# Patient Record
Sex: MALE | Race: WHITE | Employment: FULL TIME | URBAN - METROPOLITAN AREA
[De-identification: names, ages, dates, MRNs, and addresses within clinical notes are randomized per-mention and may not be internally consistent; named-entity substitution may affect disease eponyms.]

---

## 2017-01-02 DIAGNOSIS — F41.0 ANXIETY ATTACK: Primary | ICD-10-CM

## 2017-01-02 RX ORDER — ESCITALOPRAM OXALATE 20 MG/1
TABLET ORAL
Qty: 30 TABLET | Refills: 0 | Status: SHIPPED | OUTPATIENT
Start: 2017-01-02 | End: 2017-02-13

## 2017-01-02 NOTE — TELEPHONE ENCOUNTER
Medication is being filled for 1 time refill only due to:  Patient needs to be seen because needs new med follow up appt.. Je Lagunas RN

## 2017-01-02 NOTE — TELEPHONE ENCOUNTER
escitalopram (LEXAPRO) 20 MG tablet     Last Written Prescription Date: 8/23/16  Last Fill Quantity: 30, # refills: 3  Last Office Visit with [facility] primary care provider:  8/23/16        Last PHQ-9 score on record=   PHQ-9 SCORE 4/28/2014   Total Score 3

## 2017-02-13 DIAGNOSIS — F41.0 ANXIETY ATTACK: ICD-10-CM

## 2017-02-13 NOTE — TELEPHONE ENCOUNTER
escitalopram (LEXAPRO) 20 MG     Last Written Prescription Date: 1/2/17  Last Fill Quantity: 30, # refills: 0  [place] [facility] [place]  8/23/16        Last PHQ-9 score on record=   PHQ-9 SCORE 4/28/2014   Total Score 3

## 2017-02-14 RX ORDER — ESCITALOPRAM OXALATE 20 MG/1
TABLET ORAL
Qty: 30 TABLET | Refills: 0 | Status: SHIPPED | OUTPATIENT
Start: 2017-02-14 | End: 2017-03-20

## 2017-02-14 NOTE — TELEPHONE ENCOUNTER
Medication is being filled for 1 time refill only due to:  Patient needs to be seen because overdue for medication check and phq-9.   Je Lagunas RN

## 2017-03-20 DIAGNOSIS — F41.0 ANXIETY ATTACK: ICD-10-CM

## 2017-03-20 NOTE — TELEPHONE ENCOUNTER
escitalopram (LEXAPRO) 20 MG tablet     Last Written Prescription Date: 2/14/17  Last Fill Quantity: 30, # refills: 0  Last Office Visit with [facility] primary care provider:  8/23/16        Last PHQ-9 score on record=   PHQ-9 SCORE 4/28/2014   Total Score 3

## 2017-03-22 RX ORDER — ESCITALOPRAM OXALATE 20 MG/1
TABLET ORAL
Qty: 30 TABLET | Refills: 0 | Status: SHIPPED | OUTPATIENT
Start: 2017-03-22 | End: 2017-04-26

## 2017-03-22 NOTE — TELEPHONE ENCOUNTER
Routing refill request to provider for review/approval because:  Jessica given x2 and patient did not follow up, please advise  Je Lagunas RN

## 2017-03-28 ENCOUNTER — TELEPHONE (OUTPATIENT)
Dept: FAMILY MEDICINE | Facility: CLINIC | Age: 57
End: 2017-03-28

## 2017-03-28 DIAGNOSIS — E05.90 HYPERTHYROIDISM: Primary | ICD-10-CM

## 2017-03-28 NOTE — TELEPHONE ENCOUNTER
Patient requesting testosterone testing. Has a lab appt on 4/11 and would like to come in at the same time for this as well. Would like to have this added or if he needs to come into clinic to be seen first. Please call patient at [phone].    Central Scheduling  Margo ELIAS

## 2017-04-11 DIAGNOSIS — Z11.59 NEED FOR HEPATITIS C SCREENING TEST: ICD-10-CM

## 2017-04-11 DIAGNOSIS — Z00.00 ROUTINE GENERAL MEDICAL EXAMINATION AT A HEALTH CARE FACILITY: ICD-10-CM

## 2017-04-11 DIAGNOSIS — E05.90 HYPERTHYROIDISM: ICD-10-CM

## 2017-04-11 PROCEDURE — 84403 ASSAY OF TOTAL TESTOSTERONE: CPT | Performed by: FAMILY MEDICINE

## 2017-04-11 PROCEDURE — 86803 HEPATITIS C AB TEST: CPT | Performed by: FAMILY MEDICINE

## 2017-04-11 PROCEDURE — 36415 COLL VENOUS BLD VENIPUNCTURE: CPT | Performed by: FAMILY MEDICINE

## 2017-04-12 LAB — HCV AB SERPL QL IA: NORMAL

## 2017-04-13 LAB — TESTOST SERPL-MCNC: 376 NG/DL (ref 240–950)

## 2017-04-26 DIAGNOSIS — F41.0 ANXIETY ATTACK: ICD-10-CM

## 2017-04-26 RX ORDER — ESCITALOPRAM OXALATE 20 MG/1
TABLET ORAL
Qty: 30 TABLET | Refills: 0 | Status: SHIPPED | OUTPATIENT
Start: 2017-04-26 | End: 2017-05-31

## 2017-04-26 NOTE — TELEPHONE ENCOUNTER
ESCITALOPRAM 20MG TABLETS       Last Written Prescription Date: 3/22/17  Last Fill Quantity: 30, # refills: 0  [place] [facility] [place]:  8/23/16        Last PHQ-9 score on record=   PHQ-9 SCORE 4/28/2014   Total Score 3

## 2017-04-26 NOTE — TELEPHONE ENCOUNTER
Routing refill request to provider for review/approval because:  Jessica given x3 and patient did not follow up, please advise  Je Lagunas RN

## 2017-05-31 DIAGNOSIS — F41.0 ANXIETY ATTACK: ICD-10-CM

## 2017-06-02 RX ORDER — ESCITALOPRAM OXALATE 20 MG/1
TABLET ORAL
Qty: 30 TABLET | Refills: 0 | Status: SHIPPED | OUTPATIENT
Start: 2017-06-02 | End: 2017-07-09

## 2017-07-09 DIAGNOSIS — F41.0 ANXIETY ATTACK: ICD-10-CM

## 2017-07-10 NOTE — TELEPHONE ENCOUNTER
ESCITALOPRAM 20MG TABLETS       Last Written Prescription Date: 6/2/17  Last Fill Quantity: 30, # refills: 0  Last Office Visit with [facility] [place] care provider:  8/23/16        Last PHQ-9 score on record=   PHQ-9 SCORE 4/28/2014   Total Score 3

## 2017-07-11 RX ORDER — ESCITALOPRAM OXALATE 20 MG/1
20 TABLET ORAL DAILY
Qty: 30 TABLET | Refills: 0 | Status: SHIPPED | OUTPATIENT
Start: 2017-07-11 | End: 2017-08-10

## 2017-08-10 ENCOUNTER — OFFICE VISIT (OUTPATIENT)
Dept: FAMILY MEDICINE | Facility: CLINIC | Age: 57
End: 2017-08-10
Payer: COMMERCIAL

## 2017-08-10 VITALS
HEART RATE: 54 BPM | HEIGHT: 68 IN | SYSTOLIC BLOOD PRESSURE: 128 MMHG | WEIGHT: 169.2 LBS | TEMPERATURE: 97.5 F | BODY MASS INDEX: 25.64 KG/M2 | DIASTOLIC BLOOD PRESSURE: 89 MMHG

## 2017-08-10 DIAGNOSIS — F41.0 ANXIETY ATTACK: ICD-10-CM

## 2017-08-10 DIAGNOSIS — F32.0 MILD MAJOR DEPRESSION (H): Primary | ICD-10-CM

## 2017-08-10 DIAGNOSIS — Z00.01 ENCOUNTER FOR ROUTINE ADULT MEDICAL EXAM WITH ABNORMAL FINDINGS: ICD-10-CM

## 2017-08-10 DIAGNOSIS — L65.9 ALOPECIA: ICD-10-CM

## 2017-08-10 DIAGNOSIS — L40.50 PSORIATIC ARTHRITIS (H): ICD-10-CM

## 2017-08-10 DIAGNOSIS — Z00.00 ROUTINE GENERAL MEDICAL EXAMINATION AT A HEALTH CARE FACILITY: ICD-10-CM

## 2017-08-10 PROCEDURE — 99396 PREV VISIT EST AGE 40-64: CPT | Performed by: FAMILY MEDICINE

## 2017-08-10 RX ORDER — FINASTERIDE 5 MG/1
5 TABLET, FILM COATED ORAL DAILY
Qty: 90 TABLET | Refills: 3 | Status: SHIPPED | OUTPATIENT
Start: 2017-08-10

## 2017-08-10 RX ORDER — CLOBETASOL PROPIONATE 0.5 MG/ML
SOLUTION TOPICAL
Qty: 50 ML | Refills: 3 | Status: SHIPPED | OUTPATIENT
Start: 2017-08-10

## 2017-08-10 RX ORDER — ESCITALOPRAM OXALATE 20 MG/1
20 TABLET ORAL DAILY
Qty: 90 TABLET | Refills: 3 | Status: SHIPPED | OUTPATIENT
Start: 2017-08-10

## 2017-08-10 NOTE — NURSING NOTE
"Chief Complaint   Patient presents with     Physical       Initial /89 (BP Location: Right arm, Patient Position: Sitting, Cuff Size: Adult Regular)  Pulse 54  Temp 97.5  F (36.4  C) (Tympanic)  Ht 5' 8.25\" (1.734 m)  Wt 169 lb 3.2 oz (76.7 kg)  BMI 25.54 kg/m2 Estimated body mass index is 25.54 kg/(m^2) as calculated from the following:    Height as of this encounter: 5' 8.25\" (1.734 m).    Weight as of this encounter: 169 lb 3.2 oz (76.7 kg).  Medication Reconciliation: complete   Arianne Leroy CMA    "

## 2017-08-10 NOTE — MR AVS SNAPSHOT
After Visit Summary   8/10/2017    Zhao Bush    MRN: [id number]           Patient Information     Date Of Birth          1960        Visit Information        Provider Department      8/10/2017 11:00 AM Jacky Gaspar MD [facility]        Today's Diagnoses     Mild major depression (H)    -  1    Routine general medical examination at a health care facility        Encounter for routine adult medical exam with abnormal findings        Anxiety attack        Alopecia        Psoriatic arthritis (H)          Care Instructions      Preventive Health Recommendations  Male Ages 50 - 64    Yearly exam:             See your health care provider every year in order to  o   Review health changes.   o   Discuss preventive care.    o   Review your medicines if your doctor has prescribed any.     Have a cholesterol test every 5 years, or more frequently if you are at risk for high cholesterol/heart disease.     Have a diabetes test (fasting glucose) every three years. If you are at risk for diabetes, you should have this test more often.     Have a colonoscopy at age 50, or have a yearly FIT test (stool test). These exams will check for colon cancer.      Talk with your health care provider about whether or not a prostate cancer screening test (PSA) is right for you.    You should be tested each year for STDs (sexually transmitted diseases), if you re at risk.     Shots: Get a flu shot each year. Get a tetanus shot every 10 years.     Nutrition:    Eat at least 5 servings of fruits and vegetables daily.     Eat whole-grain bread, whole-wheat pasta and brown rice instead of white grains and rice.     Talk to your provider about Calcium and Vitamin D.     Lifestyle    Exercise for at least 150 minutes a week (30 minutes a day, 5 days a week). This will help you control your weight and prevent disease.     Limit alcohol to one drink per day.     No smoking.     Wear sunscreen to prevent skin  "cancer.     See your dentist every six months for an exam and cleaning.     See your eye doctor every 1 to 2 years.            Follow-ups after your visit        Who to contact     Normal or non-critical lab and imaging results will be communicated to you by [employer], letter or phone within 4 business days after the clinic has received the results. If you do not hear from us within 7 days, please contact the clinic through [employer] or phone. If you have a critical or abnormal lab result, we will notify you by phone as soon as possible.  Submit refill requests through [employer] or call your pharmacy and they will forward the refill request to us. Please allow 3 business days for your refill to be completed.          If you need to speak with a  for additional information , please call: [phone]             Additional Information About Your Visit        [employer] Information     [employer] gives you secure access to your electronic health record. If you see a primary care provider, you can also send messages to your care team and make appointments. If you have questions, please call your primary care clinic.  If you do not have a primary care provider, please call [phone] and they will assist you.        Care EveryWhere ID     This is your Care EveryWhere ID. This could be used by other organizations to access your [place] medical records  [id number]        Your Vitals Were     Pulse Temperature Height BMI (Body Mass Index)          54 97.5  F (36.4  C) (Tympanic) 5' 8.25\" (1.734 m) 25.54 kg/m2         Blood Pressure from Last 3 Encounters:   08/10/17 128/89   08/23/16 122/72   07/02/15 118/80    Weight from Last 3 Encounters:   08/10/17 169 lb 3.2 oz (76.7 kg)   08/23/16 170 lb 3.2 oz (77.2 kg)   07/02/15 164 lb 11.2 oz (74.7 kg)              Today, you had the following     No orders found for display         Today's Medication Changes          These changes are accurate as of: 8/10/17 11:59 PM.  " If you have any questions, ask your nurse or doctor.               These medicines have changed or have updated prescriptions.        Dose/Directions    escitalopram 20 MG tablet   Commonly known as:  LEXAPRO   This may have changed:  additional instructions   Used for:  Anxiety attack   Changed by:  Jacky Gaspar MD        Dose:  20 mg   Take 1 tablet (20 mg) by mouth daily   Quantity:  90 tablet   Refills:  3       * OLUX 0.05 % Foam   This may have changed:  Another medication with the same name was added. Make sure you understand how and when to take each.   Generic drug:  clobetasol propionate   Changed by:  Bro Nixon MD        4x weekly as needed   Refills:  0       * clobetasol 0.05 % external solution   Commonly known as:  TEMOVATE   This may have changed:  You were already taking a medication with the same name, and this prescription was added. Make sure you understand how and when to take each.   Used for:  Psoriatic arthritis (H)   Changed by:  Jacky Gaspar MD        Apply sparingly to affected area twice daily for 14 days.  Do not apply to face.   Quantity:  50 mL   Refills:  3       * Notice:  This list has 2 medication(s) that are the same as other medications prescribed for you. Read the directions carefully, and ask your doctor or other care provider to review them with you.         Where to get your medicines      Some of these will need a paper prescription and others can be bought over the counter.  Ask your nurse if you have questions.     Bring a paper prescription for each of these medications     clobetasol 0.05 % external solution    escitalopram 20 MG tablet    finasteride 5 MG tablet                Primary Care Provider Office Phone # Fax #    Jacky Gaspar [phone]       [facility] [address] [facility] [address]        Equal Access to Services     [facility] LILIAN AH: quan Kauffman qaybta kaalmada adeegyada,  april blackwelllatosha yuen'aan ah. So [facility] [phone].    ATENCIÓN: Si pankaj rusos, tiene a serna disposición servicios gratuitos de asistencia lingüística. Gwyn rogel [phone].    We comply with applicable federal civil rights laws and [place] laws. We do not discriminate on the basis of race, color, national origin, age, disability sex, sexual orientation or gender identity.            Thank you!     Thank you for choosing [facility]  for your care. Our goal is always to provide you with excellent care. Hearing back from our patients is one way we can continue to improve our services. Please take a few minutes to complete the written survey that you may receive in the mail after your visit with us. Thank you!             Your Updated Medication List - Protect others around you: Learn how to safely use, store and throw away your medicines at www.disposemymeds.org.          This list is accurate as of: 8/10/17 11:59 PM.  Always use your most recent med list.                   Brand Name Dispense Instructions for use Diagnosis    escitalopram 20 MG tablet    LEXAPRO    90 tablet    Take 1 tablet (20 mg) by mouth daily    Anxiety attack       finasteride 5 MG tablet    PROSCAR    90 tablet    Take 1 tablet (5 mg) by mouth daily    Alopecia       Multi-vitamin Tabs tablet      Take 1 tablet by mouth daily        * OLUX 0.05 % Foam   Generic drug:  clobetasol propionate      4x weekly as needed        * clobetasol 0.05 % external solution    TEMOVATE    50 mL    Apply sparingly to affected area twice daily for 14 days.  Do not apply to face.    Psoriatic arthritis (H)       triamcinolone 0.1 % ointment    KENALOG     HOLA THIN LAYER EXT AA BID    Routine general medical examination at a health care facility, Need for hepatitis C screening test       ZYRTEC PO      1 TABLET DAILY AS NEEDED        * Notice:  This list has 2 medication(s) that are the same as other medications prescribed for you.  Read the directions carefully, and ask your doctor or other care provider to review them with you.

## 2017-08-10 NOTE — PROGRESS NOTES
SUBJECTIVE:   CC: Zhao Bush is an 57 year old male who presents for preventative health visit.     Healthy Habits:    Do you get at least three servings of calcium containing foods daily (dairy, green leafy vegetables, etc.)? yes    Amount of exercise or daily activities, outside of work: 6 day(s) per week for 30 mins    Problems taking medications regularly No    Medication side effects: No    Have you had an eye exam in the past two years? yes    Do you see a dentist twice per year? yes    Do you have sleep apnea, excessive snoring or daytime drowsiness?yes, snoring  Wt Readings from Last 10 Encounters:   08/10/17 169 lb 3.2 oz (76.7 kg)   08/23/16 170 lb 3.2 oz (77.2 kg)   07/02/15 164 lb 11.2 oz (74.7 kg)   04/28/14 166 lb 11.2 oz (75.6 kg)   09/16/13 171 lb (77.6 kg)   04/17/13 170 lb (77.1 kg)   05/03/12 161 lb (73 kg)   11/08/11 160 lb 14.4 oz (73 kg)   10/25/10 170 lb (77.1 kg)   09/21/10 167 lb (75.8 kg)     Patient informed that anything we discuss that is not related to preventative medicine, may be billed for; patient verbalizes understanding.      Today's PHQ-2 Score:   PHQ-2 ( 1999 Pfizer) 8/10/2017 8/23/2016   Q1: Little interest or pleasure in doing things 0 0   Q2: Feeling down, depressed or hopeless 0 1   PHQ-2 Score 0 1   Q1: Little interest or pleasure in doing things - Not at all   Q2: Feeling down, depressed or hopeless - Several days   PHQ-2 Score - 1         Abuse: Current or Past(Physical, Sexual or Emotional)- No  Do you feel safe in your environment - Yes    Social History   Substance Use Topics     Smoking status: Never Smoker     Smokeless tobacco: Never Used     Alcohol use Yes      Comment: occas 5 drinks per week     The patient does not drink >3 drinks per day nor >7 drinks per week.    Last PSA:   PSA   Date Value Ref Range Status   11/11/2011 0.44 0 - 4 ug/L Final       Reviewed orders with patient. Reviewed health maintenance and updated orders accordingly - Yes  Labs  "reviewed in EPIC    Reviewed and updated as needed this visit by clinical staff  Tobacco  Allergies  Med Hx  Surg Hx  Fam Hx  Soc Hx        Reviewed and updated as needed this visit by Provider              ROS:  C: NEGATIVE for fever, chills, change in weight  I: NEGATIVE for worrisome rashes, moles or lesions  E: NEGATIVE for vision changes or irritation  ENT: NEGATIVE for ear, mouth and throat problems  R: NEGATIVE for significant cough or SOB  CV: NEGATIVE for chest pain, palpitations or peripheral edema  GI: NEGATIVE for nausea, abdominal pain, heartburn, or change in bowel habits   male: negative for dysuria, hematuria, decreased urinary stream, erectile dysfunction, urethral discharge  M: NEGATIVE for significant arthralgias or myalgia  N: NEGATIVE for weakness, dizziness or paresthesias  P: NEGATIVE for changes in mood or affect    OBJECTIVE:   /89 (BP Location: Right arm, Patient Position: Sitting, Cuff Size: Adult Regular)  Pulse 54  Temp 97.5  F (36.4  C) (Tympanic)  Ht 5' 8.25\" (1.734 m)  Wt 169 lb 3.2 oz (76.7 kg)  BMI 25.54 kg/m2  EXAM:  GENERAL: healthy, alert and no distress  NECK: no adenopathy, no asymmetry, masses, or scars and thyroid normal to palpation  RESP: lungs clear to auscultation - no rales, rhonchi or wheezes  CV: regular rate and rhythm, normal S1 S2, no S3 or S4, no murmur, click or rub, no peripheral edema and peripheral pulses strong  ABDOMEN: soft, nontender, no hepatosplenomegaly, no masses and bowel sounds normal  MS: no gross musculoskeletal defects noted, no edema    ASSESSMENT/PLAN:   1. Routine general medical examination at a health care facility      2. Encounter for routine adult medical exam with abnormal findings      3. Anxiety attack  Good control.  Continue currant medications, continue to monito   - escitalopram (LEXAPRO) 20 MG tablet; Take 1 tablet (20 mg) by mouth daily  Dispense: 90 tablet; Refill: 3    4. Alopecia  Good control.  Continue " "currant medications, continue to monito   - finasteride (PROSCAR) 5 MG tablet; Take 1 tablet (5 mg) by mouth daily  Dispense: 90 tablet; Refill: 3    5. Mild major depression (H)  Good control.  Continue currant medications, continue to monito   6. Psoriatic arthritis (H)  goo  - clobetasol (TEMOVATE) 0.05 % external solution; Apply sparingly to affected area twice daily for 14 days.  Do not apply to face.  Dispense: 50 mL; Refill: 3    COUNSELING:  Reviewed preventive health counseling, as reflected in patient instructions       Regular exercise       Healthy diet/nutrition       Vision screening       Hearing screening       Colon cancer screening       Prostate cancer screening       reports that he has never smoked. He has never used smokeless tobacco.      Estimated body mass index is 25.54 kg/(m^2) as calculated from the following:    Height as of this encounter: 5' 8.25\" (1.734 m).    Weight as of this encounter: 169 lb 3.2 oz (76.7 kg).   Weight management plan: Discussed healthy diet and exercise guidelines and patient will follow up in 12 months in clinic to re-evaluate.    Counseling Resources:  ATP IV Guidelines  Pooled Cohorts Equation Calculator  FRAX Risk Assessment  ICSI Preventive Guidelines  Dietary Guidelines for Americans, 2010  USDA's MyPlate  ASA Prophylaxis  Lung CA Screening    Jacky Gaspar MD  [facility]  "

## 2017-09-06 ENCOUNTER — TELEPHONE (OUTPATIENT)
Dept: FAMILY MEDICINE | Facility: CLINIC | Age: 57
End: 2017-09-06

## 2017-09-06 ENCOUNTER — OFFICE VISIT (OUTPATIENT)
Dept: FAMILY MEDICINE | Facility: CLINIC | Age: 57
End: 2017-09-06
Payer: COMMERCIAL

## 2017-09-06 VITALS
HEIGHT: 68 IN | DIASTOLIC BLOOD PRESSURE: 82 MMHG | WEIGHT: 171 LBS | TEMPERATURE: 97.4 F | SYSTOLIC BLOOD PRESSURE: 127 MMHG | HEART RATE: 60 BPM | BODY MASS INDEX: 25.91 KG/M2

## 2017-09-06 DIAGNOSIS — T63.441A BEE STING REACTION, ACCIDENTAL OR UNINTENTIONAL, INITIAL ENCOUNTER: ICD-10-CM

## 2017-09-06 DIAGNOSIS — L03.113 CELLULITIS OF RIGHT UPPER EXTREMITY: Primary | ICD-10-CM

## 2017-09-06 PROCEDURE — 99213 OFFICE O/P EST LOW 20 MIN: CPT | Performed by: FAMILY MEDICINE

## 2017-09-06 RX ORDER — CEPHALEXIN 500 MG/1
500 CAPSULE ORAL 3 TIMES DAILY
Qty: 21 CAPSULE | Refills: 0 | Status: SHIPPED | OUTPATIENT
Start: 2017-09-06 | End: 2018-10-08

## 2017-09-06 NOTE — TELEPHONE ENCOUNTER
"Reports that his whole right hand is swollen. \"Each time I get stung; I get more reactive.\" He said that he got stung on his left hand at the base of his thumb this past weekend.  Iced it at first. Took a couple of benadryl at first.  He said that the swelling has not gone down. He said it is red and itchy. He denies swelling anywhere else. Says he is breathing OK. Denies red streak going up his arm but he says that there is redness at his wrist. Appointment made for this afternoon.   Je Lagunas, RN    "

## 2017-09-06 NOTE — TELEPHONE ENCOUNTER
Reason for call:  Patient reporting a symptom    Symptom or request: Zhao is reporting that he was stung twice this past weekend by a wasp on his hand.  Hasn't been stung for a while, however he has noticed that with each sting, his reaction is getting worst.  His hand is very swollen and wondering if there is something he could do for it.  He is breathing just fine, it's just his hand that has been affected.  Please call and assess.  Thank you..Melab Bean    Duration (how long have symptoms been present): since the weekend -  No specific date was given    Have you been treated for this before? No    Additional comments: none    Phone Number patient can be reached at:  Home number on file [phone] (home)    Best Time:  Any time    Can we leave a detailed message on this number:  YES    Call taken on 9/6/2017 at 8:20 AM by Melba Bean

## 2017-09-06 NOTE — NURSING NOTE
"Chief Complaint   Patient presents with     Insect Bites     post bee sting       Initial /82 (BP Location: Right arm, Patient Position: Sitting, Cuff Size: Adult Large)  Pulse 60  Temp 97.4  F (36.3  C) (Tympanic)  Ht 5' 8.25\" (1.734 m)  Wt 171 lb (77.6 kg)  BMI 25.81 kg/m2 Estimated body mass index is 25.81 kg/(m^2) as calculated from the following:    Height as of this encounter: 5' 8.25\" (1.734 m).    Weight as of this encounter: 171 lb (77.6 kg).  Medication Reconciliation: complete   Arianne Leroy CMA  "

## 2017-09-06 NOTE — PROGRESS NOTES
"SUBJECTIVE:                                                    Zhao Bush is a 57 year old male who presents to clinic today for the following health issues:    **He was stung by a yellow yesterday and his left hand is swollen. He says he normally is not allergic, but ever time he gets stung it gets increasingly worse.   **His hand is red, warm, itchy, and swollen.   **Wondering if it could turn into cellulitis?    Problem list and histories reviewed & adjusted, as indicated.  Additional history:     Patient Active Problem List   Diagnosis     Psoriatic arthritis (H)     CARDIOVASCULAR SCREENING; LDL GOAL LESS THAN 160     Hyperthyroidism     Mild major depression (H)     Anxiety     Health Care Home     Advanced directives, counseling/discussion     History reviewed. No pertinent surgical history.    Social History   Substance Use Topics     Smoking status: Never Smoker     Smokeless tobacco: Never Used     Alcohol use Yes      Comment: occas 5 drinks per week    Family History   Problem Relation Age of Onset     Genitourinary Problems Father      enlarged prostate     Hypertension Father      Other - See Comments Father      Had cancerous growth on larynx that was removed.     Other Cancer Mother      a from of mylofibrosis I think - not true cancer     MENTAL ILLNESS Mother      Hypertension Paternal Grandmother      CANCER Paternal Grandfather      bladder           ROS:  Constitutional, HEENT, cardiovascular, pulmonary, gi and gu systems are negative, except as otherwise noted.      OBJECTIVE:                                                    /82 (BP Location: Right arm, Patient Position: Sitting, Cuff Size: Adult Large)  Pulse 60  Temp 97.4  F (36.3  C) (Tympanic)  Ht 5' 8.25\" (1.734 m)  Wt 171 lb (77.6 kg)  BMI 25.81 kg/m2 Body mass index is 25.81 kg/(m^2).   GENERAL: healthy, alert, well nourished, well hydrated, no distress  HENT: ear canals- normal; TMs- normal; Nose- normal; Mouth- no " ulcers, no lesions  NECK: no tenderness, no adenopathy, no asymmetry, no masses, no stiffness; thyroid- normal to palpation  RESP: lungs clear to auscultation - no rales, no rhonchi, no wheezes  CV: regular rates and rhythm, normal S1 S2, no S3 or S4 and no murmur, no click or rub -  ABDOMEN: soft, no tenderness, no  hepatosplenomegaly, no masses, normal bowel sounds       ASSESSMENT/PLAN:                                                      (L03.113) Cellulitis of right upper extremity  (primary encounter diagnosis)  Plan: cephALEXin (KEFLEX) 500 MG capsule      return to clinic or call if unimproved in 3-4 days sooner if worse.     reports that he has never smoked. He has never used smokeless tobacco.        Weight management plan: Discussed healthy diet and exercise guidelines and patient will follow up in 12 months in clinic to re-evaluate.      [facility]

## 2017-09-06 NOTE — MR AVS SNAPSHOT
"              After Visit Summary   9/6/2017    Zhao Bush    MRN: [id number]           Patient Information     Date Of Birth          1960        Visit Information        Provider Department      9/6/2017 2:00 PM Jacky Gaspar MD [facility]        Today's Diagnoses     Cellulitis of right upper extremity    -  1    Bee sting reaction, accidental or unintentional, initial encounter           Follow-ups after your visit        Who to contact     Normal or non-critical lab and imaging results will be communicated to you by [employer], letter or phone within 4 business days after the clinic has received the results. If you do not hear from us within 7 days, please contact the clinic through [employer] or phone. If you have a critical or abnormal lab result, we will notify you by phone as soon as possible.  Submit refill requests through [employer] or call your pharmacy and they will forward the refill request to us. Please allow 3 business days for your refill to be completed.          If you need to speak with a  for additional information , please call: [phone]             Additional Information About Your Visit        Double[employer]Watchup Information     [employer] gives you secure access to your electronic health record. If you see a primary care provider, you can also send messages to your care team and make appointments. If you have questions, please call your primary care clinic.  If you do not have a primary care provider, please call [phone] and they will assist you.        Care EveryWhere ID     This is your Care EveryWhere ID. This could be used by other organizations to access your [employer] medical records  [id number]        Your Vitals Were     Pulse Temperature Height BMI (Body Mass Index)          60 97.4  F (36.3  C) (Tympanic) 5' 8.25\" (1.734 m) 25.81 kg/m2         Blood Pressure from Last 3 Encounters:   09/06/17 127/82   08/10/17 128/89   08/23/16 122/72    Weight from Last " 3 Encounters:   09/06/17 171 lb (77.6 kg)   08/10/17 169 lb 3.2 oz (76.7 kg)   08/23/16 170 lb 3.2 oz (77.2 kg)              Today, you had the following     No orders found for display         Today's Medication Changes          These changes are accurate as of: 9/6/17 11:59 PM.  If you have any questions, ask your nurse or doctor.               Start taking these medicines.        Dose/Directions    cephALEXin 500 MG capsule   Commonly known as:  KEFLEX   Used for:  Cellulitis of right upper extremity   Started by:  Jacky Gaspar MD        Dose:  500 mg   Take 1 capsule (500 mg) by mouth 3 times daily   Quantity:  21 capsule   Refills:  0            Where to get your medicines      These medications were sent to [facility] - [place], [place] - [address] [facility] [address] [place] [address] [facility] [address], [place] [address]    Hours:  24-hours Phone:  [phone]     cephALEXin 500 MG capsule                Primary Care Provider Office Phone # Fax #    Jacky Gaspar [phone]       [facility] [address] [facility] [address]        Equal Access to Services     RONN QUINTANA AH: Hadii luis angel ku hadasho Soomaali, waaxda luqadaha, qaybta kaalmada adeegyada, waxay stefanie blackwelln salena gillette. So [facility] [phone].    ATENCIÓN: Si habla español, tiene a serna disposición servicios gratuitos de asistencia lingüística. [facility] [phone].    We comply with applicable federal civil rights laws and [place] laws. We do not discriminate on the basis of race, color, national origin, age, disability sex, sexual orientation or gender identity.            Thank you!     Thank you for choosing [facility]  for your care. Our goal is always to provide you with excellent care. Hearing back from our patients is one way we can continue to improve our services. Please take a few minutes to complete the written survey that you may receive in the mail  after your visit with us. Thank you!             Your Updated Medication List - Protect others around you: Learn how to safely use, store and throw away your medicines at www.disposemymeds.org.          This list is accurate as of: 9/6/17 11:59 PM.  Always use your most recent med list.                   Brand Name Dispense Instructions for use Diagnosis    cephALEXin 500 MG capsule    KEFLEX    21 capsule    Take 1 capsule (500 mg) by mouth 3 times daily    Cellulitis of right upper extremity       escitalopram 20 MG tablet    LEXAPRO    90 tablet    Take 1 tablet (20 mg) by mouth daily    Anxiety attack       finasteride 5 MG tablet    PROSCAR    90 tablet    Take 1 tablet (5 mg) by mouth daily    Alopecia       Multi-vitamin Tabs tablet      Take 1 tablet by mouth daily        * OLUX 0.05 % Foam   Generic drug:  clobetasol propionate      4x weekly as needed        * clobetasol 0.05 % external solution    TEMOVATE    50 mL    Apply sparingly to affected area twice daily for 14 days.  Do not apply to face.    Psoriatic arthritis (H)       triamcinolone 0.1 % ointment    KENALOG     HOLA THIN LAYER EXT AA BID    Routine general medical examination at a health care facility, Need for hepatitis C screening test       ZYRTEC PO      1 TABLET DAILY AS NEEDED        * Notice:  This list has 2 medication(s) that are the same as other medications prescribed for you. Read the directions carefully, and ask your doctor or other care provider to review them with you.

## 2017-12-13 DIAGNOSIS — F41.0 ANXIETY ATTACK: ICD-10-CM

## 2017-12-13 RX ORDER — ESCITALOPRAM OXALATE 20 MG/1
20 TABLET ORAL DAILY
Qty: 90 TABLET | Refills: 0 | Status: SHIPPED | OUTPATIENT
Start: 2017-12-13

## 2018-10-08 ENCOUNTER — COMMUNICATION - HEALTHEAST (OUTPATIENT)
Dept: TELEHEALTH | Facility: CLINIC | Age: 58
End: 2018-10-08

## 2018-10-08 ENCOUNTER — OFFICE VISIT (OUTPATIENT)
Dept: FAMILY MEDICINE | Facility: CLINIC | Age: 58
End: 2018-10-08
Payer: COMMERCIAL

## 2018-10-08 ENCOUNTER — OFFICE VISIT - HEALTHEAST (OUTPATIENT)
Dept: FAMILY MEDICINE | Facility: CLINIC | Age: 58
End: 2018-10-08

## 2018-10-08 ENCOUNTER — TELEPHONE (OUTPATIENT)
Dept: FAMILY MEDICINE | Facility: CLINIC | Age: 58
End: 2018-10-08

## 2018-10-08 VITALS
OXYGEN SATURATION: 98 % | WEIGHT: 174.3 LBS | RESPIRATION RATE: 16 BRPM | HEART RATE: 88 BPM | SYSTOLIC BLOOD PRESSURE: 130 MMHG | HEIGHT: 68 IN | TEMPERATURE: 97.7 F | DIASTOLIC BLOOD PRESSURE: 86 MMHG | BODY MASS INDEX: 26.42 KG/M2

## 2018-10-08 DIAGNOSIS — B02.30 HERPES ZOSTER WITH OPHTHALMIC COMPLICATION, UNSPECIFIED HERPES ZOSTER EYE DISEASE: Primary | ICD-10-CM

## 2018-10-08 DIAGNOSIS — B02.9 SHINGLES: ICD-10-CM

## 2018-10-08 PROCEDURE — 99213 OFFICE O/P EST LOW 20 MIN: CPT | Performed by: NURSE PRACTITIONER

## 2018-10-08 RX ORDER — LATANOPROST 50 UG/ML
SOLUTION/ DROPS OPHTHALMIC
Refills: 3 | COMMUNITY
Start: 2018-07-06

## 2018-10-08 RX ORDER — VALACYCLOVIR HYDROCHLORIDE 1 G/1
1000 TABLET, FILM COATED ORAL
COMMUNITY
Start: 2018-10-08 | End: 2018-10-15

## 2018-10-08 NOTE — PROGRESS NOTES
SUBJECTIVE:   Zhao Bush is a 58 year old male who presents to clinic today for the following health issues:      Rash  Onset: 2 days     Description:   Location: face  Character: blotchy, raised, red  Itching (Pruritis): YES    Progression of Symptoms:  same    Accompanying Signs & Symptoms: redness and left eye  Fever: no   Body aches or joint pain: YES- headache and stiff neck  Sore throat symptoms: no   Recent cold symptoms: YES- fullness feeling on left side of head, occasional pain above ear, states he has been battling a sinus infection for awhile. [facility] and he would not treat with antibiotic.    History:   Previous similar rash: no     Precipitating factors:   Exposure to similar rash: no   New exposures: None   [facility] [place] [place] [facility] [place]    Alleviating factors:  Sudafed helps slightly with congestion    Therapies Tried and outcome: valacyclovir-took first dose this am, eye drops (saw ophthalmology today)    Problem list and histories reviewed & adjusted, as indicated.  Additional history: as documented    Patient Active Problem List   Diagnosis     Psoriatic arthritis (H)     CARDIOVASCULAR SCREENING; LDL GOAL LESS THAN 160     Hyperthyroidism     Mild major depression (H)     Anxiety     Health Care Home     Advanced directives, counseling/discussion     No past surgical history on file.    Social History   Substance Use Topics     Smoking status: Never Smoker     Smokeless tobacco: Never Used     Alcohol use Yes      Comment: occas 5 drinks per week     Family History   Problem Relation Age of Onset     Genitourinary Problems Father      enlarged prostate     Hypertension Father      Other - See Comments Father      Had cancerous growth on larynx that was removed.     Other Cancer Mother      a from of mylofibrosis I think - not true cancer     Mental Illness Mother      Hypertension Paternal Grandmother      Cancer Paternal Grandfather      bladder      "      Reviewed and updated as needed this visit by clinical staff       Reviewed and updated as needed this visit by Provider         ROS:  Constitutional, HEENT, cardiovascular, pulmonary, gi and gu systems are negative, except as otherwise noted.    OBJECTIVE:     /86 (BP Location: Right arm, Patient Position: Sitting, Cuff Size: Adult Regular)  Pulse 88  Temp 97.7  F (36.5  C) (Tympanic)  Resp 16  Ht 5' 8.25\" (1.734 m)  Wt 174 lb 4.8 oz (79.1 kg)  SpO2 98%  BMI 26.31 kg/m2  Body mass index is 26.31 kg/(m^2).  GENERAL: healthy, alert and no distress  EYES: PERRL, EOMI and conjunctiva/corneas- conjunctival injection OS and no colored discharge present   HENT: ear canals and TM's normal, nose and mouth without ulcers or lesions  NECK: no adenopathy, no asymmetry, masses, or scars and thyroid normal to palpation  RESP: lungs clear to auscultation - no rales, rhonchi or wheezes  CV: regular rates and rhythm, normal S1 S2, no S3 or S4 and no murmur, click or rub  MS: no gross musculoskeletal defects noted, no edema  SKIN: zosteriform eruption - left forehead into scalp and down to left cheek  NEURO: Normal strength and tone, mentation intact and speech normal    Diagnostic Test Results:  none     ASSESSMENT/PLAN:       ICD-10-CM    1. Herpes zoster with ophthalmic complication, unspecified herpes zoster eye disease B02.30        CONSULTATION/REFERRAL- see ophthalmology again in 1 week as planned, sooner for eye pain, vision changes    FUTURE APPOINTMENTS:       - RETURN TO CLINIC for new or worsening symptoms. May use triamcinolone he has at home for burning/itching of rash.    ANGELITA [facility]  "

## 2018-10-08 NOTE — MR AVS SNAPSHOT
"              After Visit Summary   10/8/2018    Zhao Bush    MRN: [id number]           Patient Information     Date Of Birth          1960        Visit Information        Provider Department      10/8/2018 12:40 PM Jamaica Foster APRN [facility]        Today's Diagnoses     Herpes zoster with ophthalmic complication, unspecified herpes zoster eye disease    -  1       Follow-ups after your visit        Who to contact     Normal or non-critical lab and imaging results will be communicated to you by [employer], letter or phone within 4 business days after the clinic has received the results. If you do not hear from us within 7 days, please contact the clinic through [employer] or phone. If you have a critical or abnormal lab result, we will notify you by phone as soon as possible.  Submit refill requests through [employer] or call your pharmacy and they will forward the refill request to us. Please allow 3 business days for your refill to be completed.          If you need to speak with a  for additional information , please call: [phone]             Additional Information About Your Visit        [email or web address] Information     [employer] gives you secure access to your electronic health record. If you see a primary care provider, you can also send messages to your care team and make appointments. If you have questions, please call your primary care clinic.  If you do not have a primary care provider, please call [phone] and they will assist you.        Care EveryWhere ID     This is your Care EveryWhere ID. This could be used by other organizations to access your [place] medical records  [id number]        Your Vitals Were     Pulse Temperature Respirations Height Pulse Oximetry BMI (Body Mass Index)    88 97.7  F (36.5  C) (Tympanic) 16 5' 8.25\" (1.734 m) 98% 26.31 kg/m2       Blood Pressure from Last 3 Encounters:   10/08/18 130/86   09/06/17 127/82   08/10/17 128/89    " Weight from Last 3 Encounters:   10/08/18 174 lb 4.8 oz (79.1 kg)   09/06/17 171 lb (77.6 kg)   08/10/17 169 lb 3.2 oz (76.7 kg)              Today, you had the following     No orders found for display       Primary Care Provider Office Phone # Fax #    Jacky Gaspar [phone] [address] [address] [facility] [address]        Equal Access to Services     RONN QUINTANA : Hadii aad ku hadasho Soomaali, waaxda luqadaha, qaybta kaalmada adeegyada, waxay idiin hayaan adeeg kharayogi la'taylern . So [facility] [phone].    ATENCIÓN: Si pankaj espcharlie, tiene a serna disposición servicios gratuitos de asistencia lingüística. [facility] [phone].    We comply with applicable federal civil rights laws and [place] laws. We do not discriminate on the basis of race, color, national origin, age, disability, sex, sexual orientation, or gender identity.            Thank you!     Thank you for choosing [facility]  for your care. Our goal is always to provide you with excellent care. Hearing back from our patients is one way we can continue to improve our services. Please take a few minutes to complete the written survey that you may receive in the mail after your visit with us. Thank you!             Your Updated Medication List - Protect others around you: Learn how to safely use, store and throw away your medicines at www.disposemymeds.org.          This list is accurate as of 10/8/18  1:25 PM.  Always use your most recent med list.                   Brand Name Dispense Instructions for use Diagnosis    * escitalopram 20 MG tablet    LEXAPRO    90 tablet    Take 1 tablet (20 mg) by mouth daily    Anxiety attack       * escitalopram 20 MG tablet    LEXAPRO    90 tablet    Take 1 tablet (20 mg) by mouth daily    Anxiety attack       finasteride 5 MG tablet    PROSCAR    90 tablet    Take 1 tablet (5 mg) by mouth daily    Alopecia       latanoprost 0.005 % ophthalmic solution    XALATAN     PLACE 1 GTT INTO  RIGHT EYE ONCE A DAY        Multi-vitamin Tabs tablet      Take 1 tablet by mouth daily        * OLUX 0.05 % Foam   Generic drug:  clobetasol propionate      4x weekly as needed        * clobetasol 0.05 % external solution    TEMOVATE    50 mL    Apply sparingly to affected area twice daily for 14 days.  Do not apply to face.    Psoriatic arthritis (H)       triamcinolone 0.1 % ointment    KENALOG     HOLA THIN LAYER EXT AA BID    Routine general medical examination at a health care facility, Need for hepatitis C screening test       valACYclovir 1000 mg tablet    VALTREX     Take 1,000 mg by mouth        ZYRTEC PO      1 TABLET DAILY AS NEEDED        * Notice:  This list has 4 medication(s) that are the same as other medications prescribed for you. Read the directions carefully, and ask your doctor or other care provider to review them with you.

## 2019-03-07 ENCOUNTER — MYC MEDICAL ADVICE (OUTPATIENT)
Dept: FAMILY MEDICINE | Facility: CLINIC | Age: 59
End: 2019-03-07

## 2019-03-07 DIAGNOSIS — Z12.5 SCREENING FOR MALIGNANT NEOPLASM OF PROSTATE: ICD-10-CM

## 2019-03-07 DIAGNOSIS — Z13.6 CARDIOVASCULAR SCREENING; LDL GOAL LESS THAN 130: ICD-10-CM

## 2019-03-07 DIAGNOSIS — Z12.5 SCREENING FOR PROSTATE CANCER: Primary | ICD-10-CM

## 2019-03-27 DIAGNOSIS — Z13.6 CARDIOVASCULAR SCREENING; LDL GOAL LESS THAN 130: ICD-10-CM

## 2019-03-27 DIAGNOSIS — Z12.5 SCREENING FOR MALIGNANT NEOPLASM OF PROSTATE: ICD-10-CM

## 2019-03-27 DIAGNOSIS — Z12.5 SCREENING FOR PROSTATE CANCER: ICD-10-CM

## 2019-03-27 LAB
CHOLEST SERPL-MCNC: 196 MG/DL
CRP SERPL-MCNC: <2.9 MG/L (ref 0–8)
HDLC SERPL-MCNC: 50 MG/DL
LDLC SERPL CALC-MCNC: 127 MG/DL
NONHDLC SERPL-MCNC: 146 MG/DL
PSA SERPL-ACNC: 0.76 UG/L (ref 0–4)
TRIGL SERPL-MCNC: 95 MG/DL

## 2019-03-27 PROCEDURE — G0103 PSA SCREENING: HCPCS | Performed by: FAMILY MEDICINE

## 2019-03-27 PROCEDURE — 80061 LIPID PANEL: CPT | Performed by: FAMILY MEDICINE

## 2019-03-27 PROCEDURE — 86140 C-REACTIVE PROTEIN: CPT | Performed by: FAMILY MEDICINE

## 2019-03-27 PROCEDURE — 36415 COLL VENOUS BLD VENIPUNCTURE: CPT | Performed by: FAMILY MEDICINE

## 2019-11-09 ENCOUNTER — HEALTH MAINTENANCE LETTER (OUTPATIENT)
Age: 59
End: 2019-11-09

## 2020-06-29 ENCOUNTER — NURSE TRIAGE (OUTPATIENT)
Dept: NURSING | Facility: CLINIC | Age: 60
End: 2020-06-29

## 2020-06-29 NOTE — TELEPHONE ENCOUNTER
Caller called regarding COVID-19 testing. States he is currently in [place], will be back in [place] in July (19th).  States he will be having a group meeting and for him to be able to attend, COVID-19 testing is required.  Reported that he is not sure of any exposure.  States he lives in apartment and someone tested positive there already  See initial assessment below  Rina Dacosta RN  COVID 19 Nurse Triage Plan/Patient Instructions    Please be aware that novel coronavirus (COVID-19) may be circulating in the community. If you develop symptoms such as fever, cough, or SOB or if you have concerns about the presence of another infection including coronavirus (COVID-19), please contact your health care provider or visit [email or web address].     Disposition/Instructions    Patient to schedule a Virtual Visit with provider. Reference Visit Selection Guide.    Thank you for taking steps to prevent the spread of this virus.  o Limit your contact with others.  o Wear a simple mask to cover your cough.  o Wash your hands well and often.    Resources    [facility]: About COVID-19: [email or web address]    CDC: What to Do If You're Sick: www.cdc.gov/coronavirus/2019-ncov/about/steps-when-sick.html    CDC: Ending Home Isolation: www.cdc.gov/coronavirus/2019-ncov/hcp/disposition-in-home-patients.html     CDC: Caring for Someone: www.cdc.gov/coronavirus/2019-ncov/if-you-are-sick/care-for-someone.html     [facility]: Interim Guidance for Hospital Discharge to Home: www.health.[facility].[place]/diseases/coronavirus/hcp/hospdischarge.pdf    [facility] clinical trials (COVID-19 research studies): clinicalaffairs.[facility]-clinical-trials     Below are the COVID-19 hotlines at the [facility] ([facility]). Interpreters are available.   o For health questions: Call 258-407-2225 or 1-567.836.2145 (7 a.m. to 7 p.m.)  o For questions about schools and childcare: Call 887-582-9524 or 1-259.853.8596 (7 a.m. to  7 p.m.)                     Reason for Disposition    COVID-19 Testing, questions about    Additional Information    Negative: SEVERE difficulty breathing (e.g., struggling for each breath, speaks in single words)    Negative: Difficult to awaken or acting confused (e.g., disoriented, slurred speech)    Negative: Bluish (or gray) lips or face now    Negative: Shock suspected (e.g., cold/pale/clammy skin, too weak to stand, low BP, rapid pulse)    Negative: Sounds like a life-threatening emergency to the triager    Negative: [1] COVID-19 exposure AND [2] no symptoms    Negative: COVID-19 and Breastfeeding, questions about    Negative: [1] Adult with possible COVID-19 symptoms AND [2] triager concerned about severity of symptoms or other causes    Negative: SEVERE or constant chest pain or pressure (Exception: mild central chest pain, present only when coughing)    Negative: MODERATE difficulty breathing (e.g., speaks in phrases, SOB even at rest, pulse 100-120)    Negative: MILD difficulty breathing (e.g., minimal/no SOB at rest, SOB with walking, pulse <100)    Negative: Chest pain or pressure    Negative: Fever > 103 F (39.4 C)    Negative: [1] Fever > 101 F (38.3 C) AND [2] age > 60    Negative: [1] Fever > 100.0 F (37.8 C) AND [2] bedridden (e.g., nursing home patient, CVA, chronic illness, recovering from surgery)    Negative: HIGH RISK patient (e.g., age > 64 years, diabetes, heart or lung disease, weak immune system)    Negative: Fever present > 3 days (72 hours)    Negative: [1] Fever returns after gone for over 24 hours AND [2] symptoms worse or not improved    Negative: [1] Continuous (nonstop) coughing interferes with work or school AND [2] no improvement using cough treatment per protocol    Negative: [1] COVID-19 infection suspected by caller or triager AND [2] mild symptoms (cough, fever, or others) AND [3] no complications or SOB    Negative: Cough present > 3 weeks    Answer Assessment - Initial  "Assessment Questions  1. COVID-19 DIAGNOSIS: \"Who made your Coronavirus (COVID-19) diagnosis?\" \"Was it confirmed by a positive lab test?\" If not diagnosed by a HCP, ask \"Are there lots of cases (community spread) where you live?\" (See public health department website, if unsure)      yes  2. ONSET: \"When did the COVID-19 symptoms start?\"       no  3. WORST SYMPTOM: \"What is your worst symptom?\" (e.g., cough, fever, shortness of breath, muscle aches)      no  4. COUGH: \"Do you have a cough?\" If so, ask: \"How bad is the cough?\"        no  5. FEVER: \"Do you have a fever?\" If so, ask: \"What is your temperature, how was it measured, and when did it start?\"      no  6. RESPIRATORY STATUS: \"Describe your breathing?\" (e.g., shortness of breath, wheezing, unable to speak)       no  7. BETTER-SAME-WORSE: \"Are you getting better, staying the same or getting worse compared to yesterday?\"  If getting worse, ask, \"In what way?\"      no  8. HIGH RISK DISEASE: \"Do you have any chronic medical problems?\" (e.g., asthma, heart or lung disease, weak immune system, etc.)      no  9. PREGNANCY: \"Is there any chance you are pregnant?\" \"When was your last menstrual period?\"      no  10. OTHER SYMPTOMS: \"Do you have any other symptoms?\"  (e.g., chills, fatigue, headache, loss of smell or taste, muscle pain, sore throat)        no    Protocols used: CORONAVIRUS (COVID-19) DIAGNOSED OR [id number] 5.16.20      "

## 2020-12-06 ENCOUNTER — HEALTH MAINTENANCE LETTER (OUTPATIENT)
Age: 60
End: 2020-12-06

## 2021-06-02 VITALS — WEIGHT: 171 LBS | BODY MASS INDEX: 25.81 KG/M2

## 2021-06-20 NOTE — PROGRESS NOTES
Chief Complaint   Patient presents with     Facial Pain     Left sided- thursday     Headache     Rash     started last evening on left side of face- some itchiness         HPI    Patient is here for one day of left forehead rash with mild tenderness and minimal itching, preceded by several days of nasal congestion, and left sided headache. No runny nose, cough, fever. No recent unusual contacts nor exposures.    ROS: Pertinent ROS noted in HPI.     No Known Allergies    There is no problem list on file for this patient.      No family history on file.    Social History     Social History     Marital status:      Spouse name: N/A     Number of children: N/A     Years of education: N/A     Occupational History     Not on file.     Social History Main Topics     Smoking status: Never Smoker     Smokeless tobacco: Never Used     Alcohol use Not on file     Drug use: Not on file     Sexual activity: Not on file     Other Topics Concern     Not on file     Social History Narrative     No narrative on file         Objective:    Vitals:    10/08/18 0837   BP: 110/80   Pulse: 85   Resp: 14   Temp: 98.5  F (36.9  C)   SpO2: 98%       Gen:NAD  Throat: oropharynx clear, tonsils normal   Ears: TMs clear, canals normal with small cerumen   Nose: no discharge, normal nasal mucosa  Sinus: no pain to percussion  Eyes: minimal erythema and edema of left upper eyelid. Normal left lower eyelid. Conjunctiva clear bilaterally. Corneas grossly clear. PERRLA, EOMI  CV: RRR, normal S1S2, no M, R, G  Pulm: CTAB, normal effort  Skin: erythematous, vesicular eruptions on left forehead going into the hair area,and spreading down to the medial corner of the left eye.       Shingles  -     valACYclovir (VALTREX) 1000 MG tablet; Take 1 tablet (1,000 mg total) by mouth 3 (three) times a day for 7 days.    Recommended Ophthalmology f/u today or tomorrow. Patient said he has his own eye doctor and will call to f/u today. No visual symptoms so  far.

## 2021-09-26 ENCOUNTER — HEALTH MAINTENANCE LETTER (OUTPATIENT)
Age: 61
End: 2021-09-26

## 2022-01-15 ENCOUNTER — HEALTH MAINTENANCE LETTER (OUTPATIENT)
Age: 62
End: 2022-01-15

## 2023-04-23 ENCOUNTER — HEALTH MAINTENANCE LETTER (OUTPATIENT)
Age: 63
End: 2023-04-23